# Patient Record
Sex: MALE | Race: WHITE | NOT HISPANIC OR LATINO | URBAN - METROPOLITAN AREA
[De-identification: names, ages, dates, MRNs, and addresses within clinical notes are randomized per-mention and may not be internally consistent; named-entity substitution may affect disease eponyms.]

---

## 2021-02-24 ENCOUNTER — IMPORTED ENCOUNTER (OUTPATIENT)
Dept: URBAN - METROPOLITAN AREA CLINIC 31 | Facility: CLINIC | Age: 66
End: 2021-02-24

## 2021-02-24 PROBLEM — H25.13: Noted: 2021-02-24

## 2021-02-24 PROBLEM — H16.423: Noted: 2021-02-24

## 2021-02-24 PROBLEM — H11.441: Noted: 2021-02-24

## 2021-02-24 PROCEDURE — 99203 OFFICE O/P NEW LOW 30 MIN: CPT

## 2021-02-24 NOTE — PATIENT DISCUSSION
1. 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 No contacts for 2-3 weeks. Start Pred OD QID and Erythromycin halina QHS2. Conjunctival cyst OD - A clear fluid filled cyst of the conjunctiva of the right eye is present. Often these resolve. 3.  Nuclear Sclerotic Cataract OU: Explained how cataracts can effect vision. Recommend clinical observation. The patient was advised to contact us if any change or worsening of vision. Return for an appointment in 1 week for CE with Dr. Uday Verdugo.

## 2021-02-24 NOTE — PATIENT DISCUSSION
Conjunctival cyst OD - A clear fluid filled cyst of the conjunctiva of the right eye is present. Often these resolve. Obsevation was recommended.

## 2021-03-03 ENCOUNTER — IMPORTED ENCOUNTER (OUTPATIENT)
Dept: URBAN - METROPOLITAN AREA CLINIC 31 | Facility: CLINIC | Age: 66
End: 2021-03-03

## 2021-03-03 PROBLEM — H17.11: Noted: 2021-03-03

## 2021-03-03 PROBLEM — H25.13: Noted: 2021-03-03

## 2021-03-03 PROCEDURE — 92015 DETERMINE REFRACTIVE STATE: CPT

## 2021-03-03 PROCEDURE — 92014 COMPRE OPH EXAM EST PT 1/>: CPT

## 2021-03-03 NOTE — PATIENT DISCUSSION
1.  Discussed the risks benefits alternatives and limitations of cataract surgery including infection bleeding loss of vision retinal tears detachment. The patient stated a full understanding and a desire to proceed with the procedure in both eyes. Refractive options were reviewed. Patient has elected to be optimized for distance vision in both eyes. The patient will still need glasses for reading and to possibly fine tune distance vision. Schedule KPE/IOL OD/OS. Patient can call to schedule  final glasses given to use more instead of contacts. 2.  faint small superficial Central Corneal Scar OD . 3. 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 Return for an appointment in 1 year for comprehensive exam. with Dr. Ellin Jeans. If no surgery.

## 2022-04-02 ASSESSMENT — VISUAL ACUITY
OS_GLARE: 20/50MED
OD_PH: CC 20/30
OD_SC: 20/30-2
OD_GLARE: 20/50MED
OS_SC: 20/20-1
OS_SC: 20/20
OD_CC: 20/50
OS_CC: 20/100
OD_SC: 20/50

## 2022-04-02 ASSESSMENT — TONOMETRY
OS_IOP_MMHG: 12
OD_IOP_MMHG: 14